# Patient Record
Sex: MALE | Race: WHITE | NOT HISPANIC OR LATINO | Employment: FULL TIME | URBAN - METROPOLITAN AREA
[De-identification: names, ages, dates, MRNs, and addresses within clinical notes are randomized per-mention and may not be internally consistent; named-entity substitution may affect disease eponyms.]

---

## 2021-11-23 ENCOUNTER — LAB (OUTPATIENT)
Dept: LAB | Facility: HOSPITAL | Age: 61
End: 2021-11-23

## 2021-11-23 ENCOUNTER — OFFICE VISIT (OUTPATIENT)
Dept: ENDOCRINOLOGY | Facility: CLINIC | Age: 61
End: 2021-11-23

## 2021-11-23 VITALS
BODY MASS INDEX: 24.5 KG/M2 | HEART RATE: 81 BPM | OXYGEN SATURATION: 98 % | DIASTOLIC BLOOD PRESSURE: 79 MMHG | SYSTOLIC BLOOD PRESSURE: 122 MMHG | WEIGHT: 175 LBS | HEIGHT: 71 IN

## 2021-11-23 DIAGNOSIS — E11.65 UNCONTROLLED TYPE 2 DIABETES MELLITUS WITH HYPERGLYCEMIA (HCC): Primary | ICD-10-CM

## 2021-11-23 DIAGNOSIS — E11.65 UNCONTROLLED TYPE 2 DIABETES MELLITUS WITH HYPERGLYCEMIA (HCC): ICD-10-CM

## 2021-11-23 LAB
ALBUMIN UR-MCNC: <1.2 MG/DL
CREAT UR-MCNC: 58.1 MG/DL
EXPIRATION DATE: ABNORMAL
EXPIRATION DATE: NORMAL
GLUCOSE BLDC GLUCOMTR-MCNC: 175 MG/DL (ref 70–130)
HBA1C MFR BLD: 7.6 %
Lab: ABNORMAL
Lab: NORMAL
MICROALBUMIN/CREAT UR: NORMAL MG/G{CREAT}

## 2021-11-23 PROCEDURE — 82570 ASSAY OF URINE CREATININE: CPT

## 2021-11-23 PROCEDURE — 99203 OFFICE O/P NEW LOW 30 MIN: CPT | Performed by: INTERNAL MEDICINE

## 2021-11-23 PROCEDURE — 82043 UR ALBUMIN QUANTITATIVE: CPT

## 2021-11-23 PROCEDURE — 83036 HEMOGLOBIN GLYCOSYLATED A1C: CPT | Performed by: INTERNAL MEDICINE

## 2021-11-23 PROCEDURE — 82947 ASSAY GLUCOSE BLOOD QUANT: CPT | Performed by: INTERNAL MEDICINE

## 2021-11-23 RX ORDER — SEMAGLUTIDE 1.34 MG/ML
1 INJECTION, SOLUTION SUBCUTANEOUS
Qty: 3 ML | Refills: 5 | Status: SHIPPED | OUTPATIENT
Start: 2021-11-23 | End: 2022-10-20

## 2021-11-23 RX ORDER — SEMAGLUTIDE 1.34 MG/ML
INJECTION, SOLUTION SUBCUTANEOUS
COMMUNITY
Start: 2021-11-11 | End: 2021-11-23 | Stop reason: DRUGHIGH

## 2021-11-23 RX ORDER — ALBUTEROL SULFATE 2.5 MG/3ML
SOLUTION RESPIRATORY (INHALATION)
COMMUNITY
Start: 2021-10-13

## 2021-11-23 RX ORDER — SITAGLIPTIN 100 MG/1
100 TABLET, FILM COATED ORAL DAILY
COMMUNITY
Start: 2021-11-09 | End: 2021-11-23

## 2021-11-23 NOTE — PROGRESS NOTES
"     Office Note      Date: 2021  Patient Name: Armando Hester  MRN: [id number]  : 1960    Chief Complaint   Patient presents with   • Diabetes     type 2       History of Present Illness:   Armando Hester is a 61 y.o. male who presents for Diabetes type 2. Diagnosed in: . Treated in past with oral agents. Current treatments: ozempic and januvia. Number of insulin shots per day: none. Checks blood sugar 2 times a day. Has low blood sugar: no. Aspirin use: No -  . Statin use: No -  . ACE-I/ARB use: No -  . Last eye exam: 2020.    He has had DM education in the past.      Subjective      Diabetic Complications:  Eyes: No  Kidneys: No  Feet: No  Heart: No    Diet and Exercise:  Meals per day: 3  Minutes of exercise per week: 0 mins. But active at work.    Review of Systems:   Review of Systems   Constitutional: Negative.    HENT: Negative.    Eyes: Positive for redness and itching.   Respiratory: Negative.    Cardiovascular: Negative.    Gastrointestinal: Negative.    Endocrine: Positive for polyuria.   Genitourinary: Negative.    Musculoskeletal: Positive for back pain.   Skin: Negative.    Allergic/Immunologic: Negative.    Neurological: Negative.    Hematological: Negative.    Psychiatric/Behavioral: Negative.        The following portions of the patient's history were reviewed and updated as appropriate: allergies, current medications, past family history, past medical history, past social history, past surgical history and problem list.    Objective     Visit Vitals  /79   Pulse 81   Ht 180.3 cm (71\")   Wt 79.4 kg (175 lb)   SpO2 98%   BMI 24.41 kg/m²       Physical Exam:  Physical Exam  Constitutional:       Appearance: Normal appearance.   HENT:      Head: Normocephalic and atraumatic.   Eyes:      Extraocular Movements: Extraocular movements intact.      Conjunctiva/sclera: Conjunctivae normal.      Pupils: Pupils are equal, round, and reactive to light.   Neck:      Thyroid: No " thyroid mass, thyromegaly or thyroid tenderness.   Cardiovascular:      Rate and Rhythm: Normal rate and regular rhythm.      Pulses: Normal pulses.           Dorsalis pedis pulses are 2+ on the right side and 2+ on the left side.        Posterior tibial pulses are 2+ on the right side and 2+ on the left side.      Heart sounds: Normal heart sounds.   Pulmonary:      Effort: Pulmonary effort is normal.      Breath sounds: Normal breath sounds.   Abdominal:      General: Bowel sounds are normal.      Palpations: Abdomen is soft.   Musculoskeletal:         General: Normal range of motion.      Cervical back: Normal range of motion and neck supple.   Feet:      Right foot:      Protective Sensation: 5 sites tested. 5 sites sensed.      Skin integrity: Callus present.      Toenail Condition: Right toenails are abnormally thick.      Left foot:      Protective Sensation: 5 sites tested. 5 sites sensed.      Skin integrity: Skin integrity normal.      Toenail Condition: Left toenails are abnormally thick.      Comments: Callus on medial aspect of right 1st toe  Lymphadenopathy:      Cervical: No cervical adenopathy.   Skin:     General: Skin is warm and dry.   Neurological:      General: No focal deficit present.      Mental Status: He is alert.   Psychiatric:         Mood and Affect: Mood normal.         Behavior: Behavior normal.         Thought Content: Thought content normal.         Judgment: Judgment normal.         Labs:    HbA1c  Hemoglobin A1C   Date Value Ref Range Status   11/23/2021 7.6 % Final   .    CMP  No results found for: GLUCOSE, BUN, CREATININE, EGFRIFNONA, EGFRIFAFRI, BCR, K, CO2, CALCIUM, PROTENTOTREF, LABIL2, BILIRUBIN, AST, ALT     Lipid Panel        TSH  No results found for: TSH, FREET4     Hemoglobin A1C  Lab Results   Component Value Date    HGBA1C 7.6 11/23/2021        Microalbumin/Creatinine  No results found for: MALBCRERATI        Assessment / Plan      Assessment & Plan:  Diagnoses and all  orders for this visit:    1. Uncontrolled type 2 diabetes mellitus with hyperglycemia (HCC) (Primary)  Assessment & Plan:  Diabetes is worsening.   Stop januvia since he is on ozempic.  Increase ozempic.  Try adding SGLT-2 inhibitor.  Diabetes will be reassessed in 3 months.    Start ASA 81mg qd.  Get eye exam.    Orders:  -     POC Glycosylated Hemoglobin (Hb A1C)  -     POC Glucose, Blood  -     Microalbumin / Creatinine Urine Ratio - Urine, Clean Catch; Future    Other orders  -     Semaglutide, 1 MG/DOSE, (Ozempic, 1 MG/DOSE,) 4 MG/3ML solution pen-injector; Inject 1 mg under the skin into the appropriate area as directed Every 7 (Seven) Days.  Dispense: 3 mL; Refill: 5  -     empagliflozin (JARDIANCE) 25 MG tablet tablet; Take 1 tablet by mouth Daily.  Dispense: 30 tablet; Refill: 5       Return in about 3 months (around 2/23/2022) for Recheck with A1c.    Alan Peterson MD   11/23/2021

## 2021-11-23 NOTE — ASSESSMENT & PLAN NOTE
Diabetes is worsening.   Stop januvia since he is on ozempic.  Increase ozempic.  Try adding SGLT-2 inhibitor.  Diabetes will be reassessed in 3 months.    Start ASA 81mg qd.  Get eye exam.

## 2021-11-24 ENCOUNTER — PATIENT ROUNDING (BHMG ONLY) (OUTPATIENT)
Dept: ENDOCRINOLOGY | Facility: CLINIC | Age: 61
End: 2021-11-24

## 2021-11-24 ENCOUNTER — TELEPHONE (OUTPATIENT)
Dept: ENDOCRINOLOGY | Facility: CLINIC | Age: 61
End: 2021-11-24

## 2021-11-24 NOTE — TELEPHONE ENCOUNTER
CALLED PT TO LET HIM KNOW HE LEFT HIS FOLDER HERE YESTERDAY AND WHAT HE WANTED TO DO WITH IT. PT DID NOT ANSWER BUT LEFT VOICEMAIL.

## 2021-11-24 NOTE — PROGRESS NOTES
November 24, 2021    Hello, may I speak with Armando Hester?    My name is DANNIE      I am  with [facility]  [address] [address] [phone].    Before we get started may I verify your date of birth? 1960    I am calling to officially welcome you to our practice and ask about your recent visit. Is this a good time to talk? YES    Tell me about your visit with us. What things went well?  VISIT WENT WELL       We're always looking for ways to make our patients' experiences even better. Do you have recommendations on ways we may improve? NO    Overall were you satisfied with your first visit to our practice? YES       I appreciate you taking the time to speak with me today. Is there anything else I can do for you? NO      Thank you, and have a great day.

## 2022-02-18 ENCOUNTER — TELEPHONE (OUTPATIENT)
Dept: ENDOCRINOLOGY | Facility: CLINIC | Age: 62
End: 2022-02-18

## 2022-04-25 ENCOUNTER — DOCUMENTATION (OUTPATIENT)
Dept: ENDOCRINOLOGY | Facility: CLINIC | Age: 62
End: 2022-04-25

## 2022-04-25 NOTE — PROGRESS NOTES
Patient not eligible to fill specialty medication at [facility]. Reason:     Patient has [employer] insurance and we are unable to mail to [employer] patients    Izabela Dykes CPhT  Pharmacy Care Coordinator  4/25/2022  14:19 EDT

## 2022-05-11 ENCOUNTER — OFFICE VISIT (OUTPATIENT)
Dept: ENDOCRINOLOGY | Facility: CLINIC | Age: 62
End: 2022-05-11

## 2022-05-11 VITALS
DIASTOLIC BLOOD PRESSURE: 72 MMHG | SYSTOLIC BLOOD PRESSURE: 118 MMHG | BODY MASS INDEX: 23.63 KG/M2 | HEART RATE: 78 BPM | HEIGHT: 71 IN | OXYGEN SATURATION: 99 % | WEIGHT: 168.8 LBS

## 2022-05-11 DIAGNOSIS — E11.65 UNCONTROLLED TYPE 2 DIABETES MELLITUS WITH HYPERGLYCEMIA: Primary | ICD-10-CM

## 2022-05-11 LAB
EXPIRATION DATE: NORMAL
EXPIRATION DATE: NORMAL
GLUCOSE BLDC GLUCOMTR-MCNC: 95 MG/DL (ref 70–130)
HBA1C MFR BLD: 6.1 %
Lab: NORMAL
Lab: NORMAL

## 2022-05-11 PROCEDURE — 82947 ASSAY GLUCOSE BLOOD QUANT: CPT | Performed by: INTERNAL MEDICINE

## 2022-05-11 PROCEDURE — 99213 OFFICE O/P EST LOW 20 MIN: CPT | Performed by: INTERNAL MEDICINE

## 2022-05-11 PROCEDURE — 83036 HEMOGLOBIN GLYCOSYLATED A1C: CPT | Performed by: INTERNAL MEDICINE

## 2022-05-11 RX ORDER — ASPIRIN 81 MG/1
81 TABLET ORAL DAILY
COMMUNITY

## 2022-05-11 NOTE — PROGRESS NOTES
"     Office Note      Date: 2022  Patient Name: Armando Hester  MRN: [id number]  : 1960    Chief Complaint   Patient presents with   • Diabetes       History of Present Illness:   Armando Hester is a 61 y.o. male who presents for Diabetes type 2. Diagnosed in: . Treated in past with oral agents. Current treatments: ozempic and jardiance. Number of insulin shots per day: none. Checks blood sugar 2 times a day. Has low blood sugar: no. Aspirin use: Yes. Statin use: No -  . ACE-I/ARB use: No -  . Last eye exam: 2021.    Subjective      Diabetic Complications:  Eyes: No  Kidneys: No  Feet: No  Heart: No    Diet and Exercise:  Meals per day: 3  Minutes of exercise per week: 0 mins.    Review of Systems:   Review of Systems   Constitutional: Negative.    Cardiovascular: Negative.    Gastrointestinal: Positive for nausea.   Endocrine: Negative.        The following portions of the patient's history were reviewed and updated as appropriate: allergies, current medications, past family history, past medical history, past social history, past surgical history and problem list.    Objective       Visit Vitals  /72   Pulse 78   Ht 180.3 cm (71\")   Wt 76.6 kg (168 lb 12.8 oz)   SpO2 99%   BMI 23.54 kg/m²       Physical Exam:  Physical Exam  Constitutional:       Appearance: Normal appearance.   Neurological:      Mental Status: He is alert.         Labs:    HbA1c  Lab Results   Component Value Date    HGBA1C 6.1 2022       CMP  No results found for: GLUCOSE, BUN, CREATININE, EGFRIFNONA, EGFRIFAFRI, BCR, K, CO2, CALCIUM, PROTENTOTREF, LABIL2, BILIRUBIN, AST, ALT     Lipid Panel        TSH  No results found for: TSH, FREET4     Hemoglobin A1C  Lab Results   Component Value Date    HGBA1C 6.1 2022        Microalbumin/Creatinine  Lab Results   Component Value Date    MALBCRERATIO  2021      Comment:      Unable to calculate    MICROALBUR <1.2 2021           Assessment / Plan  "     Assessment & Plan:  Diagnoses and all orders for this visit:    1. Uncontrolled type 2 diabetes mellitus with hyperglycemia (HCC) (Primary)  Assessment & Plan:  Diabetes is improving with treatment.  A1c looks good at 6.1%.  Continue current treatment regimen.  Diabetes will be reassessed in 3 months.    Orders:  -     POC Glucose, Blood  -     POC Glycosylated Hemoglobin (Hb A1C)      Return in about 3 months (around 8/11/2022) for Recheck with A1c, CMP, lipids, TSH, microalbumin, foot exam.    Alan Peterson MD   05/11/2022

## 2022-05-11 NOTE — ASSESSMENT & PLAN NOTE
Diabetes is improving with treatment.  A1c looks good at 6.1%.  Continue current treatment regimen.  Diabetes will be reassessed in 3 months.

## 2022-08-02 RX ORDER — EMPAGLIFLOZIN 25 MG/1
TABLET, FILM COATED ORAL
Qty: 30 TABLET | Refills: 5 | Status: SHIPPED | OUTPATIENT
Start: 2022-08-02 | End: 2023-01-30

## 2022-08-12 RX ORDER — BLOOD SUGAR DIAGNOSTIC
STRIP MISCELLANEOUS
Qty: 200 EACH | Refills: 11 | Status: SHIPPED | OUTPATIENT
Start: 2022-08-12

## 2022-08-12 RX ORDER — LANCETS 33 GAUGE
EACH MISCELLANEOUS
Qty: 100 EACH | Refills: 11 | Status: SHIPPED | OUTPATIENT
Start: 2022-08-12

## 2022-10-20 ENCOUNTER — LAB (OUTPATIENT)
Dept: LAB | Facility: HOSPITAL | Age: 62
End: 2022-10-20

## 2022-10-20 ENCOUNTER — OFFICE VISIT (OUTPATIENT)
Dept: ENDOCRINOLOGY | Facility: CLINIC | Age: 62
End: 2022-10-20

## 2022-10-20 VITALS
WEIGHT: 168 LBS | BODY MASS INDEX: 24.05 KG/M2 | DIASTOLIC BLOOD PRESSURE: 90 MMHG | SYSTOLIC BLOOD PRESSURE: 130 MMHG | HEIGHT: 70 IN | OXYGEN SATURATION: 99 % | HEART RATE: 67 BPM

## 2022-10-20 DIAGNOSIS — E11.65 UNCONTROLLED TYPE 2 DIABETES MELLITUS WITH HYPERGLYCEMIA: ICD-10-CM

## 2022-10-20 DIAGNOSIS — R03.0 ELEVATED BLOOD PRESSURE READING: ICD-10-CM

## 2022-10-20 DIAGNOSIS — E11.9 TYPE 2 DIABETES MELLITUS WITHOUT COMPLICATION, WITHOUT LONG-TERM CURRENT USE OF INSULIN: Primary | ICD-10-CM

## 2022-10-20 LAB
EXPIRATION DATE: NORMAL
EXPIRATION DATE: NORMAL
GLUCOSE BLDC GLUCOMTR-MCNC: 88 MG/DL (ref 70–130)
HBA1C MFR BLD: 6 %
Lab: NORMAL
Lab: NORMAL

## 2022-10-20 PROCEDURE — 82947 ASSAY GLUCOSE BLOOD QUANT: CPT | Performed by: PHYSICIAN ASSISTANT

## 2022-10-20 PROCEDURE — 80053 COMPREHEN METABOLIC PANEL: CPT

## 2022-10-20 PROCEDURE — 83036 HEMOGLOBIN GLYCOSYLATED A1C: CPT | Performed by: PHYSICIAN ASSISTANT

## 2022-10-20 PROCEDURE — 99214 OFFICE O/P EST MOD 30 MIN: CPT | Performed by: PHYSICIAN ASSISTANT

## 2022-10-20 PROCEDURE — 82043 UR ALBUMIN QUANTITATIVE: CPT

## 2022-10-20 PROCEDURE — 82570 ASSAY OF URINE CREATININE: CPT

## 2022-10-20 PROCEDURE — 84443 ASSAY THYROID STIM HORMONE: CPT

## 2022-10-20 PROCEDURE — 80061 LIPID PANEL: CPT

## 2022-10-20 RX ORDER — SEMAGLUTIDE 1.34 MG/ML
0.5 INJECTION, SOLUTION SUBCUTANEOUS WEEKLY
Qty: 1.5 ML | Refills: 6 | Status: SHIPPED | OUTPATIENT
Start: 2022-10-20 | End: 2023-02-08 | Stop reason: SDUPTHER

## 2022-10-20 NOTE — PROGRESS NOTES
"     Office Note      Date: 10/20/2022  Patient Name: Armando Hester  MRN: [id number]  : 1960    Chief Complaint   Patient presents with   • Diabetes       History of Present Illness:   Armando Hester is a 62 y.o. male who presents for follow-up for type 2 diabetes diagnosed .  He remains on Ozempic 1 mg weekly and Jardiance 25 mg daily.  He reports he has noted increased nausea on the Ozempic 1 mg dose and is wondering if we can consider reducing this dose since his blood sugars have been good.  He reports he checks these typically in the mornings and they are 97 to 120 mg/dL.  He denies any trouble with hypoglycemia.  He reports he was recently seen for a prostate infection and finished the medication this week.  The urologist asked how long he had been on the Jardiance and when he said over a year they did not feel this was necessarily related to the infection.  He has a follow-up appointment next week for monitoring.  He is up-to-date on his eye exam he goes annually.  He denies any trouble with his feet today and is fasting for labs.  He does plan to get his flu vaccine but has not received this yet this year.      Subjective     Review of Systems:   Review of Systems   Constitutional: Negative.    Cardiovascular: Negative.    Gastrointestinal: Negative.    Endocrine: Negative.    Neurological: Negative.        The following portions of the patient's history were reviewed and updated as appropriate: allergies, current medications, past family history, past medical history, past social history, past surgical history and problem list.    Objective     Vitals:    10/20/22 1016   BP: 130/90   Pulse: 67   SpO2: 99%   Weight: 76.2 kg (168 lb)   Height: 177.8 cm (70\")     Body mass index is 24.11 kg/m².    Physical Exam  Vitals reviewed.   Constitutional:       General: He is not in acute distress.     Appearance: Normal appearance.   Cardiovascular:      Pulses:           Dorsalis pedis pulses are " 2+ on the right side and 2+ on the left side.        Posterior tibial pulses are 2+ on the right side and 2+ on the left side.   Musculoskeletal:      Right foot: No deformity.      Left foot: No deformity.   Feet:      Right foot:      Protective Sensation: 5 sites tested. 5 sites sensed.      Skin integrity: Skin integrity normal.      Toenail Condition: Right toenails are normal.      Left foot:      Protective Sensation: 5 sites tested. 5 sites sensed.      Skin integrity: Skin integrity normal.      Toenail Condition: Left toenails are normal.      Comments: Diabetic Foot Exam Performed and Monofilament Test Performed    Neurological:      Mental Status: He is alert.         HEMOGLOBIN A1C  Lab Results   Component Value Date    HGBA1C 6.0 10/20/2022       GLUCOSE  Glucose   Date Value Ref Range Status   10/20/2022 88 70 - 130 mg/dL Final       Assessment / Plan      Assessment & Plan:  1. Type 2 diabetes mellitus without complication, without long-term current use of insulin (HCC)  His hemoglobin A1c today is excellent at 6.0%.  His reported blood sugar readings have been good.  He has been nauseated on the 1 mg Ozempic dose we will try reducing this to the 0.5 mg weekly.  I sent in a prescription for the new dose.  For now he will continue Jardiance 25 mg daily.  We discussed that if his infection has not cleared when he sees the urologist next week or the urologist feels he should go off the Jardiance we can consider this.  He will reach out to our office.  His weight is down 1 pound since his appointment in May.  I encouraged healthy eating habits and physical activity as tolerated.  Foot exam was okay today.  Fasting labs pending today.  Will send note with results and plan.  - POC Glucose, Blood  - POC Glycosylated Hemoglobin (Hb A1C)  - Lipid Panel; Future  - Comprehensive Metabolic Panel; Future  - TSH; Future  - Microalbumin / Creatinine Urine Ratio - Urine, Clean Catch; Future    2. Elevated blood  pressure reading  His blood pressure is elevated today.  This is typically good and he reports another office visit is good.  He reports he has been eating a lot of salt with apples recently and is wondering if this could be the problem.  For now he will ease up on the salt and monitor his blood pressure at home.  He will contact me if this remains elevated so we can consider a low-dose blood pressure medicine.  Patient to call as needed.      Return in about 3 months (around 1/20/2023) for Recheck 3-4 mos sees Dr. Alan Peterson.     This note was dictated using Dragon voice recognition.    Hoa Moise PA-C  10/20/2022

## 2022-10-21 LAB
ALBUMIN SERPL-MCNC: 4.3 G/DL (ref 3.5–5.2)
ALBUMIN UR-MCNC: <1.2 MG/DL
ALBUMIN/GLOB SERPL: 1.6 G/DL
ALP SERPL-CCNC: 75 U/L (ref 39–117)
ALT SERPL W P-5'-P-CCNC: 18 U/L (ref 1–41)
ANION GAP SERPL CALCULATED.3IONS-SCNC: 11.7 MMOL/L (ref 5–15)
AST SERPL-CCNC: 19 U/L (ref 1–40)
BILIRUB SERPL-MCNC: 0.6 MG/DL (ref 0–1.2)
BUN SERPL-MCNC: 10 MG/DL (ref 8–23)
BUN/CREAT SERPL: 9.6 (ref 7–25)
CALCIUM SPEC-SCNC: 9.4 MG/DL (ref 8.6–10.5)
CHLORIDE SERPL-SCNC: 102 MMOL/L (ref 98–107)
CHOLEST SERPL-MCNC: 157 MG/DL (ref 0–200)
CO2 SERPL-SCNC: 27.3 MMOL/L (ref 22–29)
CREAT SERPL-MCNC: 1.04 MG/DL (ref 0.76–1.27)
CREAT UR-MCNC: 65.4 MG/DL
EGFRCR SERPLBLD CKD-EPI 2021: 81.2 ML/MIN/1.73
GLOBULIN UR ELPH-MCNC: 2.7 GM/DL
GLUCOSE SERPL-MCNC: 90 MG/DL (ref 65–99)
HDLC SERPL-MCNC: 48 MG/DL (ref 40–60)
LDLC SERPL CALC-MCNC: 93 MG/DL (ref 0–100)
LDLC/HDLC SERPL: 1.92 {RATIO}
MICROALBUMIN/CREAT UR: NORMAL MG/G{CREAT}
POTASSIUM SERPL-SCNC: 4.4 MMOL/L (ref 3.5–5.2)
PROT SERPL-MCNC: 7 G/DL (ref 6–8.5)
SODIUM SERPL-SCNC: 141 MMOL/L (ref 136–145)
TRIGL SERPL-MCNC: 85 MG/DL (ref 0–150)
TSH SERPL DL<=0.05 MIU/L-ACNC: 1.54 UIU/ML (ref 0.27–4.2)
VLDLC SERPL-MCNC: 16 MG/DL (ref 5–40)

## 2023-01-30 RX ORDER — EMPAGLIFLOZIN 25 MG/1
TABLET, FILM COATED ORAL
Qty: 30 TABLET | Refills: 3 | Status: SHIPPED | OUTPATIENT
Start: 2023-01-30 | End: 2023-02-08 | Stop reason: SDUPTHER

## 2023-02-08 RX ORDER — SEMAGLUTIDE 1.34 MG/ML
0.5 INJECTION, SOLUTION SUBCUTANEOUS WEEKLY
Qty: 4.5 ML | Refills: 1 | Status: SHIPPED | OUTPATIENT
Start: 2023-02-08 | End: 2023-02-10 | Stop reason: SDUPTHER

## 2023-02-08 NOTE — TELEPHONE ENCOUNTER
Last office visit 10/20/2022.  Follow up scheduled for 03/08/2023.  Rx's set to print as it is not in system.

## 2023-02-08 NOTE — TELEPHONE ENCOUNTER
PT CALLED REQUESTING JARDIANCE AND OZEMPIC (90 DAY RX's) TO BE SENT IN TO [facility]. HE STATED WE CAN FAX RX'S TO FAX # [phone]

## 2023-02-10 RX ORDER — SEMAGLUTIDE 1.34 MG/ML
0.5 INJECTION, SOLUTION SUBCUTANEOUS WEEKLY
Qty: 4.5 ML | Refills: 1 | Status: SHIPPED | OUTPATIENT
Start: 2023-02-10 | End: 2023-03-08 | Stop reason: SDUPTHER

## 2023-03-08 ENCOUNTER — OFFICE VISIT (OUTPATIENT)
Dept: ENDOCRINOLOGY | Facility: CLINIC | Age: 63
End: 2023-03-08
Payer: COMMERCIAL

## 2023-03-08 VITALS
OXYGEN SATURATION: 100 % | HEART RATE: 78 BPM | WEIGHT: 172 LBS | DIASTOLIC BLOOD PRESSURE: 75 MMHG | SYSTOLIC BLOOD PRESSURE: 116 MMHG | HEIGHT: 70 IN | BODY MASS INDEX: 24.62 KG/M2

## 2023-03-08 DIAGNOSIS — E11.65 TYPE 2 DIABETES MELLITUS WITH HYPERGLYCEMIA, WITHOUT LONG-TERM CURRENT USE OF INSULIN: Primary | ICD-10-CM

## 2023-03-08 LAB
EXPIRATION DATE: NORMAL
EXPIRATION DATE: NORMAL
GLUCOSE BLDC GLUCOMTR-MCNC: 118 MG/DL (ref 70–130)
HBA1C MFR BLD: 6.8 %
Lab: NORMAL
Lab: NORMAL

## 2023-03-08 PROCEDURE — 82947 ASSAY GLUCOSE BLOOD QUANT: CPT | Performed by: INTERNAL MEDICINE

## 2023-03-08 PROCEDURE — 99213 OFFICE O/P EST LOW 20 MIN: CPT | Performed by: INTERNAL MEDICINE

## 2023-03-08 PROCEDURE — 83036 HEMOGLOBIN GLYCOSYLATED A1C: CPT | Performed by: INTERNAL MEDICINE

## 2023-03-08 RX ORDER — SEMAGLUTIDE 1.34 MG/ML
0.5 INJECTION, SOLUTION SUBCUTANEOUS WEEKLY
Qty: 4.5 ML | Refills: 1 | Status: SHIPPED | OUTPATIENT
Start: 2023-03-08

## 2023-03-08 NOTE — TELEPHONE ENCOUNTER
Patient has filled the ozempic and jardiance but he would like it to go to the mail order can we put them on hold and send a 90 day supply to mail order

## 2023-03-08 NOTE — ASSESSMENT & PLAN NOTE
Diabetes is worsening.  A1c increased but still okay.  Ozempic dose was decreased last visit due to nausea.  This has helped.  Continue current treatment regimen.  Diabetes will be reassessed in 3 months.

## 2023-03-08 NOTE — PROGRESS NOTES
"     Office Note      Date: 2023  Patient Name: Armando Hester  MRN: [id number]  : 1960    Chief Complaint   Patient presents with   • Diabetes       History of Present Illness:   Armando Hester is a 62 y.o. male who presents for Diabetes type 2. Diagnosed in: . Treated in past with oral agents. Current treatments: ozempic and jardiance. Number of insulin shots per day: none. Checks blood sugar 2 times a day. Has low blood sugar: no. Aspirin use: Yes. Statin use: No -  . ACE-I/ARB use: No -  . Last eye exam: 2023.    Subjective      Diabetic Complications:  Eyes: No  Kidneys: No  Feet: No  Heart: No    Diet and Exercise:  Meals per day: 3  Minutes of exercise per week: 0 mins.    Review of Systems:   Review of Systems   Constitutional: Negative.    Cardiovascular: Negative.    Gastrointestinal: Negative.    Endocrine: Negative.        The following portions of the patient's history were reviewed and updated as appropriate: allergies, current medications, past family history, past medical history, past social history, past surgical history and problem list.    Objective       Visit Vitals  /75   Pulse 78   Ht 177.8 cm (70\")   Wt 78 kg (172 lb)   SpO2 100%   BMI 24.68 kg/m²       Physical Exam:  Physical Exam  Constitutional:       Appearance: Normal appearance.   Neurological:      Mental Status: He is alert.         Labs:    HbA1c  Lab Results   Component Value Date    HGBA1C 6.8 2023       CMP  Lab Results   Component Value Date    GLUCOSE 90 10/20/2022    BUN 10 10/20/2022    CREATININE 1.04 10/20/2022    BCR 9.6 10/20/2022    K 4.4 10/20/2022    CO2 27.3 10/20/2022    CALCIUM 9.4 10/20/2022    AST 19 10/20/2022    ALT 18 10/20/2022        Lipid Panel  Lab Results   Component Value Date    HDL 48 10/20/2022    LDL 93 10/20/2022    TRIG 85 10/20/2022        TSH  Lab Results   Component Value Date    TSH 1.540 10/20/2022        Hemoglobin A1C  Lab Results   Component Value Date "    HGBA1C 6.8 03/08/2023        Microalbumin/Creatinine  Lab Results   Component Value Date    TRAMAINE  10/20/2022      Comment:      Unable to calculate    MICROALBUR <1.2 10/20/2022           Assessment / Plan      Assessment & Plan:  Diagnoses and all orders for this visit:    1. Type 2 diabetes mellitus with hyperglycemia, without long-term current use of insulin (HCC) (Primary)  Assessment & Plan:  Diabetes is worsening.  A1c increased but still okay.  Ozempic dose was decreased last visit due to nausea.  This has helped.  Continue current treatment regimen.  Diabetes will be reassessed in 3 months.    Orders:  -     POC Glucose, Blood  -     POC Glycosylated Hemoglobin (Hb A1C)    Current Outpatient Medications   Medication Instructions   • albuterol (PROVENTIL) (2.5 MG/3ML) 0.083% nebulizer solution USE 1 VIAL IN NEBULIZER FOUR TIMES A DAY AS NEEDED   • aspirin 81 mg, Oral, Daily   • empagliflozin (JARDIANCE) 25 mg, Oral, Daily   • Lancets (OneTouch Delica Plus [id number]) misc USE AS DIRECTED.   • OneTouch Ultra test strip USE AFTER MEALS AND AT BEDTIME   • Ozempic (0.25 or 0.5 MG/DOSE) 0.5 mg, Subcutaneous, Weekly, NEW DOSE      Return in about 3 months (around 6/8/2023) for Recheck with A1c.    Alan Peterson MD   03/08/2023

## 2023-11-29 ENCOUNTER — OFFICE VISIT (OUTPATIENT)
Dept: ENDOCRINOLOGY | Facility: CLINIC | Age: 63
End: 2023-11-29
Payer: COMMERCIAL

## 2023-11-29 VITALS
HEART RATE: 78 BPM | SYSTOLIC BLOOD PRESSURE: 124 MMHG | HEIGHT: 70 IN | BODY MASS INDEX: 24.48 KG/M2 | OXYGEN SATURATION: 99 % | DIASTOLIC BLOOD PRESSURE: 70 MMHG | WEIGHT: 171 LBS

## 2023-11-29 DIAGNOSIS — E11.65 TYPE 2 DIABETES MELLITUS WITH HYPERGLYCEMIA, WITHOUT LONG-TERM CURRENT USE OF INSULIN: Primary | ICD-10-CM

## 2023-11-29 LAB
ALBUMIN SERPL-MCNC: 4.9 G/DL (ref 3.5–5.2)
ALBUMIN UR-MCNC: <1.2 MG/DL
ALBUMIN/GLOB SERPL: 1.9 G/DL
ALP SERPL-CCNC: 68 U/L (ref 39–117)
ALT SERPL W P-5'-P-CCNC: 19 U/L (ref 1–41)
ANION GAP SERPL CALCULATED.3IONS-SCNC: 11 MMOL/L (ref 5–15)
AST SERPL-CCNC: 15 U/L (ref 1–40)
BILIRUB SERPL-MCNC: 0.7 MG/DL (ref 0–1.2)
BUN SERPL-MCNC: 14 MG/DL (ref 8–23)
BUN/CREAT SERPL: 14.3 (ref 7–25)
CALCIUM SPEC-SCNC: 9.6 MG/DL (ref 8.6–10.5)
CHLORIDE SERPL-SCNC: 102 MMOL/L (ref 98–107)
CHOLEST SERPL-MCNC: 167 MG/DL (ref 0–200)
CO2 SERPL-SCNC: 26 MMOL/L (ref 22–29)
CREAT SERPL-MCNC: 0.98 MG/DL (ref 0.76–1.27)
CREAT UR-MCNC: 59.1 MG/DL
EGFRCR SERPLBLD CKD-EPI 2021: 86.6 ML/MIN/1.73
EXPIRATION DATE: ABNORMAL
EXPIRATION DATE: NORMAL
GLOBULIN UR ELPH-MCNC: 2.6 GM/DL
GLUCOSE BLDC GLUCOMTR-MCNC: 117 MG/DL (ref 70–130)
GLUCOSE SERPL-MCNC: 114 MG/DL (ref 65–99)
HBA1C MFR BLD: 6.3 % (ref 4.5–5.7)
HDLC SERPL-MCNC: 48 MG/DL (ref 40–60)
LDLC SERPL CALC-MCNC: 106 MG/DL (ref 0–100)
LDLC/HDLC SERPL: 2.19 {RATIO}
Lab: ABNORMAL
Lab: NORMAL
MICROALBUMIN/CREAT UR: NORMAL MG/G{CREAT}
POTASSIUM SERPL-SCNC: 3.8 MMOL/L (ref 3.5–5.2)
PROT SERPL-MCNC: 7.5 G/DL (ref 6–8.5)
SODIUM SERPL-SCNC: 139 MMOL/L (ref 136–145)
TRIGL SERPL-MCNC: 70 MG/DL (ref 0–150)
TSH SERPL DL<=0.05 MIU/L-ACNC: 1.69 UIU/ML (ref 0.27–4.2)
VLDLC SERPL-MCNC: 13 MG/DL (ref 5–40)

## 2023-11-29 PROCEDURE — 84443 ASSAY THYROID STIM HORMONE: CPT | Performed by: INTERNAL MEDICINE

## 2023-11-29 PROCEDURE — 80053 COMPREHEN METABOLIC PANEL: CPT | Performed by: INTERNAL MEDICINE

## 2023-11-29 PROCEDURE — 80061 LIPID PANEL: CPT | Performed by: INTERNAL MEDICINE

## 2023-11-29 PROCEDURE — 82043 UR ALBUMIN QUANTITATIVE: CPT | Performed by: INTERNAL MEDICINE

## 2023-11-29 PROCEDURE — 82570 ASSAY OF URINE CREATININE: CPT | Performed by: INTERNAL MEDICINE

## 2023-11-29 NOTE — PROGRESS NOTES
"     Office Note      Date: 2023  Patient Name: Armando Hester  MRN: [id number]  : 1960    Chief Complaint   Patient presents with    Diabetes     Type II       History of Present Illness:   Armando Hester is a 63 y.o. male who presents for Diabetes type 2. Diagnosed in: . Treated in past with oral agents. Current treatments: ozempic and jardiance. Number of insulin shots per day: none. Checks blood sugar 2 times a day. Has low blood sugar: no. Aspirin use: Yes. Statin use: No -  . ACE-I/ARB use: No -  . Change in health since last visit: none.  Last eye exam: 2023.      Subjective      Diabetic Complications:  Eyes: No  Kidneys: No  Feet: No  Heart: No    Diet and Exercise:  Meals per day: 3  Minutes of exercise per week: 0 mins.    Review of Systems:   Review of Systems   Constitutional: Negative.    Cardiovascular: Negative.    Gastrointestinal: Negative.    Endocrine: Negative.        The following portions of the patient's history were reviewed and updated as appropriate: allergies, current medications, past family history, past medical history, past social history, past surgical history, and problem list.    Objective     Visit Vitals  /70 (BP Location: Left arm, Patient Position: Sitting, Cuff Size: Adult)   Pulse 78   Ht 177.8 cm (70\")   Wt 77.6 kg (171 lb)   SpO2 99%   BMI 24.54 kg/m²       Physical Exam:  Physical Exam  Constitutional:       Appearance: Normal appearance.   Cardiovascular:      Pulses:           Dorsalis pedis pulses are 2+ on the right side and 2+ on the left side.        Posterior tibial pulses are 2+ on the right side and 2+ on the left side.   Feet:      Right foot:      Protective Sensation: 5 sites tested.  5 sites sensed.      Skin integrity: Skin integrity normal.      Toenail Condition: Right toenails are normal.      Left foot:      Protective Sensation: 5 sites tested.  5 sites sensed.      Skin integrity: Skin integrity normal.      Toenail " Condition: Left toenails are normal.   Neurological:      Mental Status: He is alert.         Labs:    HbA1c  Lab Results   Component Value Date    HGBA1C 6.3 (A) 11/29/2023       CMP  Lab Results   Component Value Date    GLUCOSE 90 10/20/2022    BUN 10 10/20/2022    CREATININE 1.04 10/20/2022    BCR 9.6 10/20/2022    K 4.4 10/20/2022    CO2 27.3 10/20/2022    CALCIUM 9.4 10/20/2022    AST 19 10/20/2022    ALT 18 10/20/2022        Lipid Panel  Lab Results   Component Value Date    HDL 48 10/20/2022    LDL 93 10/20/2022    TRIG 85 10/20/2022        TSH  Lab Results   Component Value Date    TSH 1.540 10/20/2022        Hemoglobin A1C  Lab Results   Component Value Date    HGBA1C 6.3 (A) 11/29/2023        Microalbumin/Creatinine  Lab Results   Component Value Date    MALBCRERATIO  10/20/2022      Comment:      Unable to calculate    MICROALBUR <1.2 10/20/2022           Assessment / Plan      Assessment & Plan:  Diagnoses and all orders for this visit:    1. Type 2 diabetes mellitus with hyperglycemia, without long-term current use of insulin (Primary)  Assessment & Plan:  Diabetes is improving with treatment.   Continue current treatment regimen.  Diabetes will be reassessed in 3 months.    Check CMP, lipids, TSH, microalbumin today.  Will send note about results.    Orders:  -     POC Glucose, Blood  -     POC Glycosylated Hemoglobin (Hb A1C)  -     Comprehensive Metabolic Panel; Future  -     Lipid Panel; Future  -     Microalbumin / Creatinine Urine Ratio - Urine, Clean Catch; Future  -     TSH; Future  -     Ambulatory Referral to Family Practice      Current Outpatient Medications   Medication Instructions    albuterol (PROVENTIL) (2.5 MG/3ML) 0.083% nebulizer solution USE 1 VIAL IN NEBULIZER FOUR TIMES A DAY AS NEEDED    aspirin 81 mg, Oral, Daily    empagliflozin (JARDIANCE) 25 mg, Oral, Daily    Lancets (OneTouch Delica Plus [id number]) misc USE AS DIRECTED.    OneTouch Ultra test strip USE AFTER MEALS AND AT  BEDTIME    Ozempic (0.25 or 0.5 MG/DOSE) 0.5 mg, Subcutaneous, Weekly, NEW DOSE      Return in about 3 months (around 2/29/2024) for Recheck with A1c.    Alan Peterson MD   11/29/2023

## 2023-11-29 NOTE — ASSESSMENT & PLAN NOTE
Diabetes is improving with treatment.   Continue current treatment regimen.  Diabetes will be reassessed in 3 months.    Check CMP, lipids, TSH, microalbumin today.  Will send note about results.

## 2023-12-08 RX ORDER — BLOOD SUGAR DIAGNOSTIC
STRIP MISCELLANEOUS
Qty: 200 EACH | Refills: 11 | Status: SHIPPED | OUTPATIENT
Start: 2023-12-08

## 2023-12-08 RX ORDER — LANCETS 33 GAUGE
EACH MISCELLANEOUS
Qty: 100 EACH | Refills: 11 | Status: SHIPPED | OUTPATIENT
Start: 2023-12-08

## 2023-12-08 NOTE — TELEPHONE ENCOUNTER
Rx Refill Note  Requested Prescriptions     Pending Prescriptions Disp Refills    OneTouch Ultra test strip [Pharmacy Med Name: OneTouch Ultra Test strips]  11     Sig: USE AFTER MEALS AND AT BEDTIME    Lancets (OneTouch Delica Plus [id number]) misc [Pharmacy Med Name: OneTouch Delica Plus Lancet 33 gauge] 100 each 11     Sig: USE AS DIRECTED.      Last office visit with prescribing clinician: 11/29/2023     Next office visit with prescribing clinician: 4/26/2024       Audrey Bullard MA  12/08/23, 08:40 EST

## 2023-12-26 DIAGNOSIS — E11.9 TYPE 2 DIABETES MELLITUS WITHOUT COMPLICATIONS: ICD-10-CM

## 2023-12-26 RX ORDER — SEMAGLUTIDE 0.68 MG/ML
INJECTION, SOLUTION SUBCUTANEOUS
Qty: 9 ML | Refills: 1 | Status: SHIPPED | OUTPATIENT
Start: 2023-12-26

## 2023-12-26 NOTE — TELEPHONE ENCOUNTER
Rx Refill Note  Requested Prescriptions     Pending Prescriptions Disp Refills    Ozempic, 0.25 or 0.5 MG/DOSE, 2 MG/3ML solution pen-injector [Pharmacy Med Name: Ozempic 0.25 mg or 0.5 mg (2 mg/3 mL) subcutaneous pen injector] 9 mL 1     Sig: INJECT 0.5 MG SUB-Q EVERY WEEK      Last office visit with prescribing clinician: 11/29/2023     Next office visit with prescribing clinician: 4/26/2024       Audrey Bullard MA  12/26/23, 11:39 EST

## 2024-02-07 RX ORDER — EMPAGLIFLOZIN 25 MG/1
25 TABLET, FILM COATED ORAL DAILY
Qty: 90 TABLET | Refills: 1 | Status: SHIPPED | OUTPATIENT
Start: 2024-02-07

## 2024-02-07 NOTE — TELEPHONE ENCOUNTER
Rx Refill Note  Requested Prescriptions     Pending Prescriptions Disp Refills    Jardiance 25 MG tablet tablet [Pharmacy Med Name: Jardiance 25 mg tablet] 90 tablet 1     Sig: TAKE ONE TABLET BY MOUTH EVERY DAY      Last office visit with prescribing clinician: 11/29/2023   Last telemedicine visit with prescribing clinician: Visit date not found   Next office visit with prescribing clinician: 4/26/2024                         Would you like a call back once the refill request has been completed: [] Yes [] No    If the office needs to give you a call back, can they leave a voicemail: [] Yes [] No    Woo Heart MA  02/07/24, 14:33 EST

## 2024-04-26 ENCOUNTER — OFFICE VISIT (OUTPATIENT)
Dept: ENDOCRINOLOGY | Facility: CLINIC | Age: 64
End: 2024-04-26
Payer: COMMERCIAL

## 2024-04-26 VITALS
SYSTOLIC BLOOD PRESSURE: 122 MMHG | DIASTOLIC BLOOD PRESSURE: 75 MMHG | OXYGEN SATURATION: 96 % | WEIGHT: 166 LBS | BODY MASS INDEX: 23.24 KG/M2 | HEART RATE: 82 BPM | HEIGHT: 71 IN

## 2024-04-26 DIAGNOSIS — E11.65 TYPE 2 DIABETES MELLITUS WITH HYPERGLYCEMIA, WITHOUT LONG-TERM CURRENT USE OF INSULIN: Primary | ICD-10-CM

## 2024-04-26 LAB
EXPIRATION DATE: ABNORMAL
EXPIRATION DATE: ABNORMAL
GLUCOSE BLDC GLUCOMTR-MCNC: 231 MG/DL (ref 70–130)
HBA1C MFR BLD: 8.7 % (ref 4.5–5.7)
Lab: ABNORMAL
Lab: ABNORMAL

## 2024-04-26 RX ORDER — SEMAGLUTIDE 1.34 MG/ML
1 INJECTION, SOLUTION SUBCUTANEOUS WEEKLY
Qty: 9 ML | Refills: 3 | Status: SHIPPED | OUTPATIENT
Start: 2024-04-26

## 2024-04-26 NOTE — PROGRESS NOTES
"     Office Note      Date: 2024  Patient Name: Armando Hester  MRN: [id number]  : 1960    Chief Complaint   Patient presents with    Diabetes       History of Present Illness:   Armando Hester is a 63 y.o. male who presents for Diabetes type 2. Diagnosed in: . Treated in past with oral agents. Current treatments: ozempic and jardiance. Number of insulin shots per day: none. Checks blood sugar 1 time a day. Has low blood sugar: no. Aspirin use: Yes. Statin use: No. ACE-I/ARB use: No. Change in health since last visit: none.  Last eye exam: 2023.      Subjective      Diabetic Complications:  Eyes: No  Kidneys: No  Feet: No  Heart: No    Diet and Exercise:  Meals per day: 3  Minutes of exercise per week: 0 mins.    Review of Systems:   Review of Systems   Constitutional: Negative.    Cardiovascular: Negative.    Gastrointestinal: Negative.    Endocrine: Negative.        The following portions of the patient's history were reviewed and updated as appropriate: allergies, current medications, past family history, past medical history, past social history, past surgical history, and problem list.    Objective     Visit Vitals  /75   Pulse 82   Ht 180.3 cm (71\")   Wt 75.3 kg (166 lb)   SpO2 96%   BMI 23.15 kg/m²       Physical Exam:  Physical Exam  Constitutional:       Appearance: Normal appearance.   Neurological:      Mental Status: He is alert.         Labs:    HbA1c  Lab Results   Component Value Date    HGBA1C 8.7 (A) 2024       CMP  Lab Results   Component Value Date    GLUCOSE 114 (H) 2023    BUN 14 2023    CREATININE 0.98 2023    BCR 14.3 2023    K 3.8 2023    CO2 26.0 2023    CALCIUM 9.6 2023    AST 15 2023    ALT 19 2023        Lipid Panel  Lab Results   Component Value Date    HDL 48 2023     (H) 2023    TRIG 70 2023        TSH  Lab Results   Component Value Date    TSH 1.690 2023    "     Hemoglobin A1C  Lab Results   Component Value Date    HGBA1C 8.7 (A) 04/26/2024        Microalbumin/Creatinine  Lab Results   Component Value Date    MALBCRERATIO  11/29/2023      Comment:      Unable to calculate    MICROALBUR <1.2 11/29/2023           Assessment / Plan      Assessment & Plan:  Diagnoses and all orders for this visit:    1. Type 2 diabetes mellitus with hyperglycemia, without long-term current use of insulin (Primary)  Assessment & Plan:  Diabetes is worsening.   We discussed treatment options.   Titrate up ozempic.  Diabetes will be reassessed in 6 months.    Orders:  -     POC Glucose, Blood  -     POC Glycosylated Hemoglobin (Hb A1C)    Other orders  -     Semaglutide, 1 MG/DOSE, (Ozempic, 1 MG/DOSE,) 4 MG/3ML solution pen-injector; Inject 1 mg under the skin into the appropriate area as directed 1 (One) Time Per Week.  Dispense: 9 mL; Refill: 3      Current Outpatient Medications   Medication Instructions    albuterol (PROVENTIL) (2.5 MG/3ML) 0.083% nebulizer solution USE 1 VIAL IN NEBULIZER FOUR TIMES A DAY AS NEEDED    aspirin 81 mg, Oral, Daily    glucose blood (OneTouch Ultra) test strip USE AFTER MEALS AND AT BEDTIME    Jardiance 25 mg, Oral, Daily    Lancets (OneTouch Delica Plus [id number]) misc USE AS DIRECTED.    Ozempic (1 MG/DOSE) 1 mg, Subcutaneous, Weekly      Return in about 6 months (around 10/26/2024) for Recheck with A1c, CMP, lipid, TSH, microalbumin, foot exam.    Electronically signed by: Alan Peterson MD  04/26/2024

## 2024-05-24 NOTE — ASSESSMENT & PLAN NOTE
Diabetes is worsening.   We discussed treatment options.   Titrate up ozempic.  Diabetes will be reassessed in 6 months.  
PAST MEDICAL HISTORY:  No pertinent past medical history

## 2024-08-28 RX ORDER — EMPAGLIFLOZIN 25 MG/1
25 TABLET, FILM COATED ORAL DAILY
Qty: 90 TABLET | Refills: 3 | Status: SHIPPED | OUTPATIENT
Start: 2024-08-28

## 2024-12-16 ENCOUNTER — OFFICE VISIT (OUTPATIENT)
Dept: ENDOCRINOLOGY | Facility: CLINIC | Age: 64
End: 2024-12-16
Payer: COMMERCIAL

## 2024-12-16 VITALS
OXYGEN SATURATION: 98 % | SYSTOLIC BLOOD PRESSURE: 120 MMHG | HEART RATE: 81 BPM | BODY MASS INDEX: 23.66 KG/M2 | DIASTOLIC BLOOD PRESSURE: 74 MMHG | WEIGHT: 169 LBS | HEIGHT: 71 IN

## 2024-12-16 DIAGNOSIS — E11.65 TYPE 2 DIABETES MELLITUS WITH HYPERGLYCEMIA, WITHOUT LONG-TERM CURRENT USE OF INSULIN: Primary | ICD-10-CM

## 2024-12-16 PROBLEM — E78.5 DYSLIPIDEMIA: Status: ACTIVE | Noted: 2024-12-16

## 2024-12-16 LAB
ALBUMIN SERPL-MCNC: 4.5 G/DL (ref 3.5–5.2)
ALBUMIN UR-MCNC: <1.2 MG/DL
ALBUMIN/GLOB SERPL: 1.4 G/DL
ALP SERPL-CCNC: 83 U/L (ref 39–117)
ALT SERPL W P-5'-P-CCNC: 21 U/L (ref 1–41)
ANION GAP SERPL CALCULATED.3IONS-SCNC: 12.3 MMOL/L (ref 5–15)
AST SERPL-CCNC: 18 U/L (ref 1–40)
BILIRUB SERPL-MCNC: 0.7 MG/DL (ref 0–1.2)
BUN SERPL-MCNC: 20 MG/DL (ref 8–23)
BUN/CREAT SERPL: 23 (ref 7–25)
CALCIUM SPEC-SCNC: 9.6 MG/DL (ref 8.6–10.5)
CHLORIDE SERPL-SCNC: 103 MMOL/L (ref 98–107)
CHOLEST SERPL-MCNC: 169 MG/DL (ref 0–200)
CO2 SERPL-SCNC: 24.7 MMOL/L (ref 22–29)
CREAT SERPL-MCNC: 0.87 MG/DL (ref 0.76–1.27)
CREAT UR-MCNC: 40.1 MG/DL
EGFRCR SERPLBLD CKD-EPI 2021: 96.4 ML/MIN/1.73
EXPIRATION DATE: ABNORMAL
EXPIRATION DATE: NORMAL
GLOBULIN UR ELPH-MCNC: 3.2 GM/DL
GLUCOSE BLDC GLUCOMTR-MCNC: 109 MG/DL (ref 70–130)
GLUCOSE SERPL-MCNC: 105 MG/DL (ref 65–99)
HBA1C MFR BLD: 7.3 % (ref 4.5–5.7)
HDLC SERPL-MCNC: 46 MG/DL (ref 40–60)
LDLC SERPL CALC-MCNC: 110 MG/DL (ref 0–100)
LDLC/HDLC SERPL: 2.37 {RATIO}
Lab: ABNORMAL
Lab: NORMAL
MICROALBUMIN/CREAT UR: NORMAL MG/G{CREAT}
POTASSIUM SERPL-SCNC: 4 MMOL/L (ref 3.5–5.2)
PROT SERPL-MCNC: 7.7 G/DL (ref 6–8.5)
SODIUM SERPL-SCNC: 140 MMOL/L (ref 136–145)
TRIGL SERPL-MCNC: 70 MG/DL (ref 0–150)
TSH SERPL DL<=0.05 MIU/L-ACNC: 1.72 UIU/ML (ref 0.27–4.2)
VLDLC SERPL-MCNC: 13 MG/DL (ref 5–40)

## 2024-12-16 PROCEDURE — 80053 COMPREHEN METABOLIC PANEL: CPT | Performed by: INTERNAL MEDICINE

## 2024-12-16 PROCEDURE — 82570 ASSAY OF URINE CREATININE: CPT | Performed by: INTERNAL MEDICINE

## 2024-12-16 PROCEDURE — 80061 LIPID PANEL: CPT | Performed by: INTERNAL MEDICINE

## 2024-12-16 PROCEDURE — 84443 ASSAY THYROID STIM HORMONE: CPT | Performed by: INTERNAL MEDICINE

## 2024-12-16 PROCEDURE — 82947 ASSAY GLUCOSE BLOOD QUANT: CPT | Performed by: INTERNAL MEDICINE

## 2024-12-16 PROCEDURE — 83036 HEMOGLOBIN GLYCOSYLATED A1C: CPT | Performed by: INTERNAL MEDICINE

## 2024-12-16 PROCEDURE — 82043 UR ALBUMIN QUANTITATIVE: CPT | Performed by: INTERNAL MEDICINE

## 2024-12-16 PROCEDURE — 99213 OFFICE O/P EST LOW 20 MIN: CPT | Performed by: INTERNAL MEDICINE

## 2024-12-16 RX ORDER — TAMSULOSIN HYDROCHLORIDE 0.4 MG/1
1 CAPSULE ORAL DAILY
COMMUNITY

## 2024-12-16 NOTE — PROGRESS NOTES
"     Office Note      Date: 2024  Patient Name: Armando Hester  MRN: [id number]  : 1960    Chief Complaint   Patient presents with    Diabetes       History of Present Illness:   Armando Hester is a 64 y.o. male who presents for Diabetes type 2. Diagnosed in: . Treated in past with oral agents. Current treatments: ozempic and jardiance. Number of insulin shots per day: none. Checks blood sugar 1 time a day. Has low blood sugar: no. Aspirin use: Yes. Statin use: No. ACE-I/ARB use: No. Change in health since last visit: started flomax - scheduled for cystoscopy soon.  Last eye exam: 2024.      Subjective      Diabetic Complications:  Eyes: No  Kidneys: No  Feet: No  Heart: No    Diet and Exercise:  Meals per day: 3  Minutes of exercise per week: 0 mins.    Review of Systems:   Review of Systems   Constitutional: Negative.    Cardiovascular: Negative.    Gastrointestinal:  Positive for constipation.   Endocrine: Negative.        The following portions of the patient's history were reviewed and updated as appropriate: allergies, current medications, past family history, past medical history, past social history, past surgical history, and problem list.    Objective     Visit Vitals  /74   Pulse 81   Ht 180.3 cm (71\")   Wt 76.7 kg (169 lb)   SpO2 98%   BMI 23.57 kg/m²       Physical Exam:  Physical Exam  Constitutional:       Appearance: Normal appearance.   Cardiovascular:      Pulses:           Dorsalis pedis pulses are 2+ on the right side and 2+ on the left side.        Posterior tibial pulses are 2+ on the right side and 2+ on the left side.   Feet:      Right foot:      Protective Sensation: 5 sites tested.  5 sites sensed.      Skin integrity: Skin integrity normal.      Toenail Condition: Right toenails are normal.      Left foot:      Protective Sensation: 5 sites tested.  5 sites sensed.      Skin integrity: Skin integrity normal.      Toenail Condition: Left toenails are normal. " "  Neurological:      Mental Status: He is alert.         Labs:    HbA1c  Lab Results   Component Value Date    HGBA1C 7.3 (A) 12/16/2024       CMP  Lab Results   Component Value Date    GLUCOSE 114 (H) 11/29/2023    BUN 14 11/29/2023    CREATININE 0.98 11/29/2023    BCR 14.3 11/29/2023    K 3.8 11/29/2023    CO2 26.0 11/29/2023    CALCIUM 9.6 11/29/2023    AST 15 11/29/2023    ALT 19 11/29/2023        Lipid Panel  Lab Results   Component Value Date    HDL Cholesterol 48 11/29/2023    LDL Cholesterol  106 (H) 11/29/2023    LDL/HDL Ratio 2.19 11/29/2023    Triglycerides 70 11/29/2023        TSH  Lab Results   Component Value Date    TSH 1.690 11/29/2023        Hemoglobin A1C  No components found for: \"HGBA1C\"     Microalbumin/Creatinine  Lab Results   Component Value Date    MALBCRERATIO  11/29/2023      Comment:      Unable to calculate    MICROALBUR <1.2 11/29/2023           Assessment / Plan      Assessment & Plan:  Diagnoses and all orders for this visit:    1. Type 2 diabetes mellitus with hyperglycemia, without long-term current use of insulin (Primary)  Assessment & Plan:  Diabetes is improving with treatment.   Continue current treatment regimen.  Diabetes will be reassessed in 6 months.    Orders:  -     Cancel: POC Glucose, Blood  -     Cancel: POC Glycosylated Hemoglobin (Hb A1C)  -     POC Glucose, Blood  -     POC Glycosylated Hemoglobin (Hb A1C)  -     Comprehensive Metabolic Panel; Future  -     Lipid Panel; Future  -     Microalbumin / Creatinine Urine Ratio - Urine, Clean Catch; Future  -     TSH; Future      Current Outpatient Medications   Medication Instructions    albuterol (PROVENTIL) (2.5 MG/3ML) 0.083% nebulizer solution USE 1 VIAL IN NEBULIZER FOUR TIMES A DAY AS NEEDED    aspirin 81 mg, Daily    glucose blood (OneTouch Ultra) test strip USE AFTER MEALS AND AT BEDTIME    Jardiance 25 mg, Oral, Daily    Lancets (OneTouch Delica Plus [id number]) misc USE AS DIRECTED.    Ozempic (1 MG/DOSE) 1 mg, " Subcutaneous, Weekly    tamsulosin (FLOMAX) 0.4 MG capsule 24 hr capsule 1 capsule, Daily      Return in about 6 months (around 6/16/2025) for Recheck with A1c.    Electronically signed by: Alan Peterson MD  12/16/2024

## 2025-01-08 ENCOUNTER — PRIOR AUTHORIZATION (OUTPATIENT)
Dept: ENDOCRINOLOGY | Facility: CLINIC | Age: 65
End: 2025-01-08
Payer: COMMERCIAL

## 2025-01-10 NOTE — TELEPHONE ENCOUNTER
ANDREI SELAM (Key: [id number])  Rx #: [id number]  Ozempic (1 MG/DOSE) 4MG/3ML pen-injectors  Form  [facility] Prior Authorization Request Form  Created  1 day ago  Sent to Plan  1 day ago  Plan Response  1 day ago  Submit Clinical Questions  1 day ago  Determination  Favorable  13 hours ago  Message from Plan  [id number] Approved, coverage starts on: 2025-01-01, coverage ends on: 2099-12-31, Questions? Contact [phone].. Authorization Expiration Date: December 31, 2099.

## 2025-05-05 RX ORDER — SEMAGLUTIDE 1.34 MG/ML
INJECTION, SOLUTION SUBCUTANEOUS
Qty: 9 ML | Refills: 3 | Status: SHIPPED | OUTPATIENT
Start: 2025-05-05

## 2025-05-05 NOTE — TELEPHONE ENCOUNTER
Rx Refill Note  Requested Prescriptions     Pending Prescriptions Disp Refills    Ozempic, 1 MG/DOSE, 4 MG/3ML solution pen-injector [Pharmacy Med Name: Ozempic 1 mg/dose (4 mg/3 mL) subcutaneous pen injector] 9 mL 3     Sig: INJECT 1 MG SUB-Q EVERY WEEK      Last office visit with prescribing clinician: 12/16/2024   Last telemedicine visit with prescribing clinician: Visit date not found   Next office visit with prescribing clinician: 6/26/2025                         Would you like a call back once the refill request has been completed: [] Yes [] No    If the office needs to give you a call back, can they leave a voicemail: [] Yes [] No    Woo Heart MA  05/05/25, 10:56 EDT